# Patient Record
Sex: MALE
[De-identification: names, ages, dates, MRNs, and addresses within clinical notes are randomized per-mention and may not be internally consistent; named-entity substitution may affect disease eponyms.]

---

## 2018-03-01 ENCOUNTER — RESULT REVIEW (OUTPATIENT)
Age: 48
End: 2018-03-01

## 2018-03-01 ENCOUNTER — INPATIENT (INPATIENT)
Facility: HOSPITAL | Age: 48
LOS: 0 days | Discharge: HOME | End: 2018-03-02
Attending: SURGERY

## 2018-03-01 VITALS
OXYGEN SATURATION: 98 % | TEMPERATURE: 97 F | RESPIRATION RATE: 20 BRPM | HEART RATE: 101 BPM | DIASTOLIC BLOOD PRESSURE: 89 MMHG | SYSTOLIC BLOOD PRESSURE: 145 MMHG

## 2018-03-01 DIAGNOSIS — K35.3 ACUTE APPENDICITIS WITH LOCALIZED PERITONITIS: ICD-10-CM

## 2018-03-01 LAB
ANION GAP SERPL CALC-SCNC: 7 MMOL/L — SIGNIFICANT CHANGE UP (ref 7–14)
APPEARANCE UR: CLEAR — SIGNIFICANT CHANGE UP
APTT BLD: 27.2 SEC — SIGNIFICANT CHANGE UP (ref 27–39.2)
BASOPHILS # BLD AUTO: 0.08 K/UL — SIGNIFICANT CHANGE UP (ref 0–0.2)
BASOPHILS NFR BLD AUTO: 0.4 % — SIGNIFICANT CHANGE UP (ref 0–1)
BILIRUB UR-MCNC: NEGATIVE — SIGNIFICANT CHANGE UP
BLD GP AB SCN SERPL QL: SIGNIFICANT CHANGE UP
BUN SERPL-MCNC: 17 MG/DL — SIGNIFICANT CHANGE UP (ref 10–20)
CALCIUM SERPL-MCNC: 9.4 MG/DL — SIGNIFICANT CHANGE UP (ref 8.5–10.1)
CHLORIDE SERPL-SCNC: 105 MMOL/L — SIGNIFICANT CHANGE UP (ref 98–110)
CO2 SERPL-SCNC: 25 MMOL/L — SIGNIFICANT CHANGE UP (ref 17–32)
COLOR SPEC: YELLOW — SIGNIFICANT CHANGE UP
CREAT SERPL-MCNC: 0.8 MG/DL — SIGNIFICANT CHANGE UP (ref 0.7–1.5)
DIFF PNL FLD: NEGATIVE — SIGNIFICANT CHANGE UP
EOSINOPHIL # BLD AUTO: 0.26 K/UL — SIGNIFICANT CHANGE UP (ref 0–0.7)
EOSINOPHIL NFR BLD AUTO: 1.4 % — SIGNIFICANT CHANGE UP (ref 0–8)
GLUCOSE SERPL-MCNC: 209 MG/DL — HIGH (ref 70–110)
GLUCOSE UR QL: >=1000
HCT VFR BLD CALC: 44.9 % — SIGNIFICANT CHANGE UP (ref 42–52)
HGB BLD-MCNC: 15.4 G/DL — SIGNIFICANT CHANGE UP (ref 14–18)
IMM GRANULOCYTES NFR BLD AUTO: 0.6 % — HIGH (ref 0.1–0.3)
INR BLD: 1 RATIO — SIGNIFICANT CHANGE UP (ref 0.65–1.3)
KETONES UR-MCNC: NEGATIVE — SIGNIFICANT CHANGE UP
LEUKOCYTE ESTERASE UR-ACNC: NEGATIVE — SIGNIFICANT CHANGE UP
LYMPHOCYTES # BLD AUTO: 22.4 % — SIGNIFICANT CHANGE UP (ref 20.5–51.1)
LYMPHOCYTES # BLD AUTO: 4.15 K/UL — HIGH (ref 1.2–3.4)
MCHC RBC-ENTMCNC: 28.7 PG — SIGNIFICANT CHANGE UP (ref 27–31)
MCHC RBC-ENTMCNC: 34.3 G/DL — SIGNIFICANT CHANGE UP (ref 32–37)
MCV RBC AUTO: 83.8 FL — SIGNIFICANT CHANGE UP (ref 80–94)
MONOCYTES # BLD AUTO: 1.5 K/UL — HIGH (ref 0.1–0.6)
MONOCYTES NFR BLD AUTO: 8.1 % — SIGNIFICANT CHANGE UP (ref 1.7–9.3)
NEUTROPHILS # BLD AUTO: 12.45 K/UL — HIGH (ref 1.4–6.5)
NEUTROPHILS NFR BLD AUTO: 67.1 % — SIGNIFICANT CHANGE UP (ref 42.2–75.2)
NITRITE UR-MCNC: NEGATIVE — SIGNIFICANT CHANGE UP
NRBC # BLD: 0 /100 WBCS — SIGNIFICANT CHANGE UP (ref 0–0)
PH UR: 6 — SIGNIFICANT CHANGE UP (ref 5–8)
PLATELET # BLD AUTO: 371 K/UL — SIGNIFICANT CHANGE UP (ref 130–400)
POTASSIUM SERPL-MCNC: 4.1 MMOL/L — SIGNIFICANT CHANGE UP (ref 3.5–5)
POTASSIUM SERPL-SCNC: 4.1 MMOL/L — SIGNIFICANT CHANGE UP (ref 3.5–5)
PROT UR-MCNC: NEGATIVE — SIGNIFICANT CHANGE UP
PROTHROM AB SERPL-ACNC: 10.8 SEC — SIGNIFICANT CHANGE UP (ref 9.95–12.87)
RBC # BLD: 5.36 M/UL — SIGNIFICANT CHANGE UP (ref 4.7–6.1)
RBC # FLD: 13 % — SIGNIFICANT CHANGE UP (ref 11.5–14.5)
SODIUM SERPL-SCNC: 137 MMOL/L — SIGNIFICANT CHANGE UP (ref 135–146)
SP GR SPEC: >=1.03 — SIGNIFICANT CHANGE UP (ref 1.01–1.03)
TYPE + AB SCN PNL BLD: SIGNIFICANT CHANGE UP
UROBILINOGEN FLD QL: 0.2 — SIGNIFICANT CHANGE UP (ref 0.2–0.2)
WBC # BLD: 18.56 K/UL — HIGH (ref 4.8–10.8)
WBC # FLD AUTO: 18.56 K/UL — HIGH (ref 4.8–10.8)

## 2018-03-01 RX ORDER — MORPHINE SULFATE 50 MG/1
4 CAPSULE, EXTENDED RELEASE ORAL
Qty: 0 | Refills: 0 | Status: DISCONTINUED | OUTPATIENT
Start: 2018-03-01 | End: 2018-03-02

## 2018-03-01 RX ORDER — ACETAMINOPHEN 500 MG
650 TABLET ORAL EVERY 6 HOURS
Qty: 0 | Refills: 0 | Status: DISCONTINUED | OUTPATIENT
Start: 2018-03-01 | End: 2018-03-01

## 2018-03-01 RX ORDER — PANTOPRAZOLE SODIUM 20 MG/1
40 TABLET, DELAYED RELEASE ORAL DAILY
Qty: 0 | Refills: 0 | Status: DISCONTINUED | OUTPATIENT
Start: 2018-03-01 | End: 2018-03-01

## 2018-03-01 RX ORDER — SODIUM CHLORIDE 9 MG/ML
3 INJECTION INTRAMUSCULAR; INTRAVENOUS; SUBCUTANEOUS EVERY 8 HOURS
Qty: 0 | Refills: 0 | Status: DISCONTINUED | OUTPATIENT
Start: 2018-03-01 | End: 2018-03-01

## 2018-03-01 RX ORDER — MORPHINE SULFATE 50 MG/1
2 CAPSULE, EXTENDED RELEASE ORAL
Qty: 0 | Refills: 0 | Status: DISCONTINUED | OUTPATIENT
Start: 2018-03-01 | End: 2018-03-02

## 2018-03-01 RX ORDER — HEPARIN SODIUM 5000 [USP'U]/ML
5000 INJECTION INTRAVENOUS; SUBCUTANEOUS EVERY 8 HOURS
Qty: 0 | Refills: 0 | Status: DISCONTINUED | OUTPATIENT
Start: 2018-03-01 | End: 2018-03-01

## 2018-03-01 RX ORDER — FENOFIBRATE,MICRONIZED 130 MG
1 CAPSULE ORAL
Qty: 0 | Refills: 0 | COMMUNITY

## 2018-03-01 RX ORDER — MORPHINE SULFATE 50 MG/1
4 CAPSULE, EXTENDED RELEASE ORAL ONCE
Qty: 0 | Refills: 0 | Status: DISCONTINUED | OUTPATIENT
Start: 2018-03-01 | End: 2018-03-01

## 2018-03-01 RX ORDER — KETOROLAC TROMETHAMINE 30 MG/ML
30 SYRINGE (ML) INJECTION ONCE
Qty: 0 | Refills: 0 | Status: DISCONTINUED | OUTPATIENT
Start: 2018-03-01 | End: 2018-03-02

## 2018-03-01 RX ORDER — CEFOTETAN DISODIUM 1 G
1 VIAL (EA) INJECTION EVERY 12 HOURS
Qty: 0 | Refills: 0 | Status: DISCONTINUED | OUTPATIENT
Start: 2018-03-01 | End: 2018-03-01

## 2018-03-01 RX ORDER — SODIUM CHLORIDE 9 MG/ML
1000 INJECTION INTRAMUSCULAR; INTRAVENOUS; SUBCUTANEOUS
Qty: 0 | Refills: 0 | Status: DISCONTINUED | OUTPATIENT
Start: 2018-03-01 | End: 2018-03-01

## 2018-03-01 RX ORDER — SODIUM CHLORIDE 9 MG/ML
1000 INJECTION, SOLUTION INTRAVENOUS
Qty: 0 | Refills: 0 | Status: DISCONTINUED | OUTPATIENT
Start: 2018-03-01 | End: 2018-03-02

## 2018-03-01 RX ORDER — MORPHINE SULFATE 50 MG/1
4 CAPSULE, EXTENDED RELEASE ORAL
Qty: 0 | Refills: 0 | Status: DISCONTINUED | OUTPATIENT
Start: 2018-03-01 | End: 2018-03-01

## 2018-03-01 RX ORDER — AMPICILLIN SODIUM AND SULBACTAM SODIUM 250; 125 MG/ML; MG/ML
3 INJECTION, POWDER, FOR SUSPENSION INTRAMUSCULAR; INTRAVENOUS ONCE
Qty: 0 | Refills: 0 | Status: DISCONTINUED | OUTPATIENT
Start: 2018-03-01 | End: 2018-03-01

## 2018-03-01 RX ORDER — MORPHINE SULFATE 50 MG/1
4 CAPSULE, EXTENDED RELEASE ORAL EVERY 4 HOURS
Qty: 0 | Refills: 0 | Status: DISCONTINUED | OUTPATIENT
Start: 2018-03-01 | End: 2018-03-01

## 2018-03-01 RX ORDER — HEPARIN SODIUM 5000 [USP'U]/ML
5000 INJECTION INTRAVENOUS; SUBCUTANEOUS EVERY 8 HOURS
Qty: 0 | Refills: 0 | Status: DISCONTINUED | OUTPATIENT
Start: 2018-03-01 | End: 2018-03-02

## 2018-03-01 RX ORDER — SODIUM CHLORIDE 9 MG/ML
1000 INJECTION, SOLUTION INTRAVENOUS
Qty: 0 | Refills: 0 | Status: DISCONTINUED | OUTPATIENT
Start: 2018-03-01 | End: 2018-03-01

## 2018-03-01 RX ORDER — AMPICILLIN SODIUM AND SULBACTAM SODIUM 250; 125 MG/ML; MG/ML
INJECTION, POWDER, FOR SUSPENSION INTRAMUSCULAR; INTRAVENOUS
Qty: 0 | Refills: 0 | Status: DISCONTINUED | OUTPATIENT
Start: 2018-03-01 | End: 2018-03-01

## 2018-03-01 RX ORDER — SODIUM CHLORIDE 9 MG/ML
1000 INJECTION INTRAMUSCULAR; INTRAVENOUS; SUBCUTANEOUS
Qty: 0 | Refills: 0 | Status: DISCONTINUED | OUTPATIENT
Start: 2018-03-01 | End: 2018-03-02

## 2018-03-01 RX ORDER — AMPICILLIN SODIUM AND SULBACTAM SODIUM 250; 125 MG/ML; MG/ML
3 INJECTION, POWDER, FOR SUSPENSION INTRAMUSCULAR; INTRAVENOUS EVERY 6 HOURS
Qty: 0 | Refills: 0 | Status: DISCONTINUED | OUTPATIENT
Start: 2018-03-01 | End: 2018-03-01

## 2018-03-01 RX ADMIN — SODIUM CHLORIDE 200 MILLILITER(S): 9 INJECTION INTRAMUSCULAR; INTRAVENOUS; SUBCUTANEOUS at 05:05

## 2018-03-01 RX ADMIN — SODIUM CHLORIDE 125 MILLILITER(S): 9 INJECTION INTRAMUSCULAR; INTRAVENOUS; SUBCUTANEOUS at 10:13

## 2018-03-01 RX ADMIN — SODIUM CHLORIDE 50 MILLILITER(S): 9 INJECTION, SOLUTION INTRAVENOUS at 17:00

## 2018-03-01 RX ADMIN — Medication 100 GRAM(S): at 05:44

## 2018-03-01 RX ADMIN — MORPHINE SULFATE 4 MILLIGRAM(S): 50 CAPSULE, EXTENDED RELEASE ORAL at 14:00

## 2018-03-01 RX ADMIN — MORPHINE SULFATE 4 MILLIGRAM(S): 50 CAPSULE, EXTENDED RELEASE ORAL at 05:15

## 2018-03-01 RX ADMIN — SODIUM CHLORIDE 3 MILLILITER(S): 9 INJECTION INTRAMUSCULAR; INTRAVENOUS; SUBCUTANEOUS at 05:05

## 2018-03-01 RX ADMIN — HEPARIN SODIUM 5000 UNIT(S): 5000 INJECTION INTRAVENOUS; SUBCUTANEOUS at 21:27

## 2018-03-01 NOTE — CHART NOTE - NSCHARTNOTEFT_GEN_A_CORE
PACU ANESTHESIA ADMISSION NOTE      Procedure: Lap appendectomy   Post op diagnosis:  Acute appendicitis       Patent Airway     Full return of protective reflexes     Full recovery from anesthesia / back to baseline     Vitals:   T:     98.9f      R: 14                 BP:   139/65               Sat:  95%                 P: 98      Mental Status:  __x__ Awake   ___x__ Alert       Nausea/Vomiting: NO     Pain Scale (0-10):  0       Post - Operative Fluids:   See Post - Op Orders    Plan: Discharge:   _Floor

## 2018-03-01 NOTE — PRE-ANESTHESIA EVALUATION ADULT - NSANTHPMHFT_GEN_ALL_CORE
47 y.o male with PMHx of obesity , heavy smoker , HTN , DMTII. Presents with 2 weeks of abdominal pain , on CT acute appendicitis

## 2018-03-01 NOTE — PROGRESS NOTE ADULT - SUBJECTIVE AND OBJECTIVE BOX
POST-OP CHECK    PROCEDURE: S/P laparoscopic appendectomy    S: Pt awake and alert resting comfortaby in bed. Pain controlled. Pt denies N/V, SOB, CP, palpitations.     O:   T(C): 37.1 (03-01-18 @ 17:30), Max: 37.1 (03-01-18 @ 17:30)  HR: 100 (03-01-18 @ 17:30) (93 - 106)  BP: 126/74 (03-01-18 @ 17:30) (113/70 - 135/82)  RR: 18 (03-01-18 @ 17:30) (16 - 26)  SpO2: 95% (03-01-18 @ 17:30) (93% - 95%)  I&O's Summary    01 Mar 2018 07:01  -  01 Mar 2018 18:02  --------------------------------------------------------  IN: 150 mL / OUT: 0 mL / NET: 150 mL      Diet, Regular      PHYSICAL EXAM:    GEN: NAD  ABD: soft, nontender, nondistended, no rebound or guarding, dressing is clean, dry, intact  INC: C/D/I    MEDICATIONS  (STANDING):  heparin  Injectable 5000 Unit(s) SubCutaneous every 8 hours  lactated ringers. 1000 milliLiter(s) (50 mL/Hr) IV Continuous <Continuous>  sodium chloride 0.9%. 1000 milliLiter(s) (20 mL/Hr) IV Continuous <Continuous>    MEDICATIONS  (PRN):  ketorolac   Injectable 30 milliGRAM(s) IV Push once PRN Moderate Pain  morphine  - Injectable 2 milliGRAM(s) IV Push every 10 minutes PRN Moderate Pain (4 - 6)  morphine  - Injectable 4 milliGRAM(s) IV Push every 10 minutes PRN Severe Pain (7 - 10)      LABS:                        15.4   18.56 )-----------( 371      ( 01 Mar 2018 05:05 )             44.9     03-01    137  |  105  |  17  ----------------------------<  209<H>  4.1   |  25  |  0.8    Ca    9.4      01 Mar 2018 05:05        PT/INR - ( 01 Mar 2018 09:30 )   PT: 10.80 sec;   INR: 1.00 ratio         PTT - ( 01 Mar 2018 09:30 )  PTT:27.2 sec

## 2018-03-01 NOTE — H&P ADULT - NSHPPHYSICALEXAM_GEN_ALL_CORE
· Physical Examination: VITAL SIGNS: I have reviewed nursing notes and confirm.  CONSTITUTIONAL: Well-developed; well-nourished; in no acute distress.  SKIN: Skin exam is warm and dry, no acute rash.  HEAD: Normocephalic; atraumatic.  EYES: PERRL, EOM intact; conjunctiva and sclera clear.  ENT: No nasal discharge; airway clear. TMs clear.  NECK: Supple; non tender.  CARD: S1, S2 normal; no murmurs, gallops, or rubs. Regular rate and rhythm.  RESP: No wheezes, rales or rhonchi.  ABD: Abdomen soft, right lower quadrant guarding, no rebound, no generalized peritonism. Rovsing's positive.   EXT: Normal ROM. No clubbing, cyanosis or edema.  LYMPH: No acute cervical adenopathy.  NEURO: Alert, oriented. Grossly unremarkable. No focal deficits.

## 2018-03-01 NOTE — H&P ADULT - NSHPREVIEWOFSYSTEMS_GEN_ALL_CORE
· CONSTITUTIONAL: no fever and no chills.  · EYES: no discharge, no irritation, no pain, no redness, and no visual changes.  · ENMT: Ears: no ear pain and no hearing problems.Nose: no nasal congestion and no nasal drainage.Mouth/Throat: no dysphagia, no hoarseness and no throat pain.Neck: no lumps, no pain, no stiffness and no swollen glands.  · CARDIOVASCULAR: normal rate and rhythm, no chest pain and no edema.  · RESPIRATORY: no chest pain, no cough, and no shortness of breath.  · MUSCULOSKELETAL: no back pain, no gout, no musculoskeletal pain, no neck pain, and no weakness.  · SKIN: no abrasions, no jaundice, no lesions, no pruritis, and no rashes.  · NEURO: no loss of consciousness, no gait abnormality, no headache, no sensory deficits, and no weakness.

## 2018-03-01 NOTE — H&P ADULT - ASSESSMENT
47 year old male presents with 2 weeks of abdominal pain with findings consistent with acute appendicitis.     Discussed patient with Dr Mclean.     Plan:  NPO, IVF  IV abx   Analgesia  OR for lap appendectomy

## 2018-03-01 NOTE — H&P ADULT - NSHPLABSRESULTS_GEN_ALL_CORE
Labs:  CAPILLARY BLOOD GLUCOSE                              15.4   18.56 )-----------( 371      ( 01 Mar 2018 05:05 )             44.9       Auto Neutrophil %: 67.1 % (03-01-18 @ 05:05)  Auto Immature Granulocyte %: 0.6 % (03-01-18 @ 05:05)    03-01    137  |  105  |  17  ----------------------------<  209<H>  4.1   |  25  |  0.8      Calcium, Total Serum: 9.4 mg/dL (03-01-18 @ 05:05)      LFTs:         Coags:        Urinalysis Basic - ( 01 Mar 2018 05:05 )    Color: Yellow / Appearance: Clear / SG: >=1.030 / pH: x  Gluc: x / Ketone: Negative  / Bili: Negative / Urobili: 0.2   Blood: x / Protein: Negative / Nitrite: Negative   Leuk Esterase: Negative / RBC: x / WBC x   Sq Epi: x / Non Sq Epi: x / Bacteria: x        < from: CT Abdomen and Pelvis w/ IV Cont (03.01.18 @ 06:05) >    BOWEL: There is no bowel obstruction. The appendix isdilated to 1 cm   with periappendiceal inflammation noted.    < end of copied text >

## 2018-03-01 NOTE — ED PROVIDER NOTE - OBJECTIVE STATEMENT
pt c/o lwoer ab pain for several days, saaw pmd and given abx, scheduled for outpt CT but couldn't wait. no fever n, v, d. pain non radiating.  no back pain. no cp or sob. no bloody stools.

## 2018-03-01 NOTE — ED PROVIDER NOTE - PHYSICAL EXAMINATION
VITAL SIGNS: I have reviewed nursing notes and confirm.  CONSTITUTIONAL: Well-developed; well-nourished; in no acute distress.  SKIN: Skin exam is warm and dry, no acute rash.  HEAD: Normocephalic; atraumatic.  EYES: PERRL, EOM intact; conjunctiva and sclera clear.  ENT: No nasal discharge; airway clear. TMs clear.  NECK: Supple; non tender.  CARD: S1, S2 normal; no murmurs, gallops, or rubs. Regular rate and rhythm.  RESP: No wheezes, rales or rhonchi.  ABD: Normal bowel sounds; soft; non-distended; tender rlq/suprapubic, no rebound. no hepatosplenomegaly.  EXT: Normal ROM. No clubbing, cyanosis or edema.  LYMPH: No acute cervical adenopathy.  NEURO: Alert, oriented. Grossly unremarkable. No focal deficits.  PSYCH: Cooperative, appropriate.

## 2018-03-01 NOTE — BRIEF OPERATIVE NOTE - PROCEDURE
<<-----Click on this checkbox to enter Procedure Appendectomy, laparoscopic  03/01/2018    Active  [id number]

## 2018-03-01 NOTE — PRE-ANESTHESIA EVALUATION ADULT - NSANTHOSAYNRD_GEN_A_CORE
No. ROM screening performed.  STOP BANG Legend: 0-2 = LOW Risk; 3-4 = INTERMEDIATE Risk; 5-8 = HIGH Risk

## 2018-03-01 NOTE — PROGRESS NOTE ADULT - ASSESSMENT
ASSESSMENT/PLAN:   47y y/o  Male  s/p laparoscopic appendectomy    PLAN:   - tolerating diet  - ambulate  -trial of void   -pain control  - GI/DVT ppx  - D/C home in the AM    SPECTRA: [id number]

## 2018-03-01 NOTE — H&P ADULT - HISTORY OF PRESENT ILLNESS
47 year old male who presents with 2 weeks of lower abdominal pain. Patient recalls no trigger and there was sudeen onset of central lower abdominal pain with radiation of pain down bilateral testicles. He noted increasing urinary frequency and urinary retention without burning sensation on urination. Urine looked of light color - yellow/green. Otherwise there was no upper abdominal discomfort or symptoms. He continues to tolerate PO intake with good appetite, denying nausea or vomiting. He also denies having fever at home. Due to the urinary symptoms he was given PO antibiotics by his PMD which did not resolve the symptoms. He denies previous pain similar to this and has had no previous abdominal surgeries.

## 2018-03-01 NOTE — ED PROVIDER NOTE - MEDICAL DECISION MAKING DETAILS
see note Chart finished.     D/w Surgery - requesting coags and EKG/XR chest that they will follow up.  Pt for OR later today.

## 2018-03-02 ENCOUNTER — TRANSCRIPTION ENCOUNTER (OUTPATIENT)
Age: 48
End: 2018-03-02

## 2018-03-02 VITALS
HEART RATE: 97 BPM | SYSTOLIC BLOOD PRESSURE: 111 MMHG | TEMPERATURE: 98 F | DIASTOLIC BLOOD PRESSURE: 70 MMHG | RESPIRATION RATE: 18 BRPM

## 2018-03-02 RX ORDER — IBUPROFEN 200 MG
600 TABLET ORAL EVERY 6 HOURS
Qty: 0 | Refills: 0 | Status: DISCONTINUED | OUTPATIENT
Start: 2018-03-02 | End: 2018-03-02

## 2018-03-02 RX ORDER — OXYCODONE HYDROCHLORIDE 5 MG/1
1 TABLET ORAL
Qty: 5 | Refills: 0 | OUTPATIENT
Start: 2018-03-02

## 2018-03-02 RX ORDER — OXYCODONE HYDROCHLORIDE 5 MG/1
5 TABLET ORAL EVERY 4 HOURS
Qty: 0 | Refills: 0 | Status: DISCONTINUED | OUTPATIENT
Start: 2018-03-02 | End: 2018-03-02

## 2018-03-02 RX ORDER — IBUPROFEN 200 MG
1 TABLET ORAL
Qty: 20 | Refills: 0 | OUTPATIENT
Start: 2018-03-02 | End: 2018-03-06

## 2018-03-02 RX ORDER — ACETAMINOPHEN 500 MG
2 TABLET ORAL
Qty: 0 | Refills: 0 | COMMUNITY
Start: 2018-03-02

## 2018-03-02 RX ORDER — IBUPROFEN 200 MG
1 TABLET ORAL
Qty: 0 | Refills: 0 | COMMUNITY
Start: 2018-03-02

## 2018-03-02 RX ORDER — ACETAMINOPHEN 500 MG
650 TABLET ORAL EVERY 6 HOURS
Qty: 0 | Refills: 0 | Status: DISCONTINUED | OUTPATIENT
Start: 2018-03-02 | End: 2018-03-02

## 2018-03-02 RX ADMIN — HEPARIN SODIUM 5000 UNIT(S): 5000 INJECTION INTRAVENOUS; SUBCUTANEOUS at 05:16

## 2018-03-02 RX ADMIN — Medication 650 MILLIGRAM(S): at 05:16

## 2018-03-02 RX ADMIN — Medication 600 MILLIGRAM(S): at 05:16

## 2018-03-02 RX ADMIN — Medication 650 MILLIGRAM(S): at 11:11

## 2018-03-02 RX ADMIN — Medication 600 MILLIGRAM(S): at 11:11

## 2018-03-02 NOTE — PROGRESS NOTE ADULT - SUBJECTIVE AND OBJECTIVE BOX
GENERAL SURGERY PROGRESS NOTE    Patient: KATHI KEYS , 47y ([birth year])Male   MRN: [id number]  Location: [address]  Visit: 03-01-18 Inpatient  Date: 03-02-18 @ 04:45    Hospital Day #:2  Post-Op Day #:1    Procedure/Dx/Injuries: S/P LAPAROSCOPIC APPENDECTOMY    Events of past 24 hours: no acute events o/n    PAST MEDICAL & SURGICAL HISTORY:  Diabetes mellitus, type 2  Hypertension  High cholesterol    Vitals: T(F): 96.4 (03-02-18 @ 00:06), Max: 98.9 (03-01-18 @ 13:53)  HR: 104 (03-02-18 @ 00:06)  BP: 127/78 (03-02-18 @ 00:06)  RR: 18 (03-02-18 @ 00:06)  SpO2: 95% (03-01-18 @ 18:18)    Pain (0-10):3/10         Pain Control Adequate: [x] YES [] N    Diet, Regular    Fluids: IVL    I & O's:    Bowel Movement: : [] YES [x] NO  Flatus: : [] YES [x] NO    PHYSICAL EXAM  GEN: NAD  ABD: SOFT, NT/ND, NO REBOUND, NO GUARDING. INCISIONAL DRESSING C/D/I  INCISION: C/D/I    MEDICATIONS  (STANDING):  heparin  Injectable 5000 Unit(s) SubCutaneous every 8 hours  lactated ringers. 1000 milliLiter(s) (50 mL/Hr) IV Continuous <Continuous>    MEDICATIONS  (PRN):  morphine  - Injectable 2 milliGRAM(s) IV Push every 10 minutes PRN Moderate Pain (4 - 6)  morphine  - Injectable 4 milliGRAM(s) IV Push every 10 minutes PRN Severe Pain (7 - 10)    DVT PROPHYLAXIS: [X] YES [] NO   GI PROPHYLAXIS: [] YES [X] NO   ANTICOAGULATION: [] YES [X] NO   ANTIBIOTICS: [] YES [X] NO     LAB/STUDIES:               15.4   18.56 )-----------( 371      ( 01 Mar 2018 05:05 )             44.9     03-01    137  |  105  |  17  ----------------------------<  209<H>  4.1   |  25  |  0.8    Ca    9.4      01 Mar 2018 05:05        PT/INR - ( 01 Mar 2018 09:30 )   PT: 10.80 sec;   INR: 1.00 ratio         PTT - ( 01 Mar 2018 09:30 )  PTT:27.2 sec  Urinalysis Basic - ( 01 Mar 2018 05:05 )    Color: Yellow / Appearance: Clear / SG: >=1.030 / pH: x  Gluc: x / Ketone: Negative  / Bili: Negative / Urobili: 0.2   Blood: x / Protein: Negative / Nitrite: Negative   Leuk Esterase: Negative / RBC: x / WBC x   Sq Epi: x / Non Sq Epi: x / Bacteria: x

## 2018-03-02 NOTE — DISCHARGE NOTE ADULT - FINDINGS/TREATMENT
uncomplicated appendectomy. no abx needed. Will be discharged to home with pain regimen for pain control, follow up with Dr. Mclean in 2 weeks.

## 2018-03-02 NOTE — DISCHARGE NOTE ADULT - HOSPITAL COURSE
47 year old male who presents with 2 weeks of lower abdominal pain. Patient recalls no trigger and there was sudden onset of central lower abdominal pain with radiation of pain down bilateral testicles. He noted increasing urinary frequency and urinary retention without burning sensation on urination. Urine looked of light color - yellow/green. Otherwise there was no upper abdominal discomfort or symptoms. He continues to tolerate PO intake with good appetite, denying nausea or vomiting. He also denies having fever at home. Due to the urinary symptoms he was given PO antibiotics by his PMD which did not resolve the symptoms. He denies previous pain similar to this and has had no previous abdominal surgeries. CT scan showed acute appendicitis. The patient was taken to the OR for laparoscopic appendectomy and admitted for observation overnight. The patient tolerated regular diet, ambulated and will be discharged to home with instructions to follow up as an outpatient with Dr. Mclean in 2 weeks.

## 2018-03-02 NOTE — PROGRESS NOTE ADULT - ASSESSMENT
ASSESSMENT:  48M PRESENTED W/ LOWER ABD PAIN, ON WORK UP FOUND TO HAVE ACUTE APPENDICTIS, NOW S/P LAPAROSCOPIC APPENDECTOMY. DOING WELL, TOLERATING DIET, AMBULATING, VOIDING, PAIN CONTROLLED.     PLAN:  - TOLERATING DIET, AMBULATING, VOIDING,   - D/C HOME IN AM

## 2018-03-02 NOTE — DISCHARGE NOTE ADULT - CARE PLAN
Principal Discharge DX:	Acute appendicitis with localized peritonitis  Goal:	Complete Recovery  Assessment and plan of treatment:	Take tylenol and motrin every 4-6 hours around the clock for at least 5 days for pain. Take Roxicodone as needed for breakthrough pain.  Follow up with Dr. Mclean as an outpatient in 2 weeks, call to schedule an appointment.  Avoid heavy lifting for two weeks. (nothing over 10lbs)  Secondary Diagnosis:	High cholesterol

## 2018-03-02 NOTE — DISCHARGE NOTE ADULT - CARE PROVIDER_API CALL
Zuhair Mclean), Surgery  [address] [place] [address]  Phone: [phone]  Fax: [phone]

## 2018-03-02 NOTE — DISCHARGE NOTE ADULT - PLAN OF CARE
Complete Recovery Take tylenol and motrin every 4-6 hours around the clock for at least 5 days for pain. Take Roxicodone as needed for breakthrough pain.  Follow up with Dr. Mclean as an outpatient in 2 weeks, call to schedule an appointment.  Avoid heavy lifting for two weeks. (nothing over 10lbs)

## 2018-03-02 NOTE — DISCHARGE NOTE ADULT - PATIENT PORTAL LINK FT
You can access the [facility] Patient Portal, offered by [facility], by registering with the following website: [email or web address]

## 2018-03-05 PROBLEM — I10 ESSENTIAL (PRIMARY) HYPERTENSION: Chronic | Status: ACTIVE | Noted: 2018-03-01

## 2018-03-05 PROBLEM — E11.9 TYPE 2 DIABETES MELLITUS WITHOUT COMPLICATIONS: Chronic | Status: ACTIVE | Noted: 2018-03-01

## 2018-03-05 PROBLEM — E78.00 PURE HYPERCHOLESTEROLEMIA, UNSPECIFIED: Chronic | Status: ACTIVE | Noted: 2018-03-01

## 2018-03-06 DIAGNOSIS — I10 ESSENTIAL (PRIMARY) HYPERTENSION: ICD-10-CM

## 2018-03-06 DIAGNOSIS — E11.9 TYPE 2 DIABETES MELLITUS WITHOUT COMPLICATIONS: ICD-10-CM

## 2018-03-06 DIAGNOSIS — K35.3 ACUTE APPENDICITIS WITH LOCALIZED PERITONITIS: ICD-10-CM

## 2018-03-06 DIAGNOSIS — E78.00 PURE HYPERCHOLESTEROLEMIA, UNSPECIFIED: ICD-10-CM

## 2018-03-06 DIAGNOSIS — R10.9 UNSPECIFIED ABDOMINAL PAIN: ICD-10-CM

## 2018-03-06 PROBLEM — Z00.00 ENCOUNTER FOR PREVENTIVE HEALTH EXAMINATION: Status: ACTIVE | Noted: 2018-03-06

## 2018-03-06 LAB — SURGICAL PATHOLOGY STUDY: SIGNIFICANT CHANGE UP

## 2018-03-08 ENCOUNTER — APPOINTMENT (OUTPATIENT)
Dept: SURGERY | Facility: CLINIC | Age: 48
End: 2018-03-08
Payer: COMMERCIAL

## 2018-03-08 VITALS
BODY MASS INDEX: 32.82 KG/M2 | SYSTOLIC BLOOD PRESSURE: 132 MMHG | WEIGHT: 278 LBS | DIASTOLIC BLOOD PRESSURE: 80 MMHG | HEIGHT: 77 IN

## 2018-03-08 DIAGNOSIS — K35.3 ACUTE APPENDICITIS WITH LOCALIZED PERITONITIS: ICD-10-CM

## 2018-03-08 PROCEDURE — 99024 POSTOP FOLLOW-UP VISIT: CPT

## 2018-03-16 ENCOUNTER — TRANSCRIPTION ENCOUNTER (OUTPATIENT)
Age: 48
End: 2018-03-16

## 2018-03-16 ENCOUNTER — OUTPATIENT (OUTPATIENT)
Dept: OUTPATIENT SERVICES | Facility: HOSPITAL | Age: 48
LOS: 1 days | Discharge: HOME | End: 2018-03-16

## 2018-03-16 DIAGNOSIS — E29.1 TESTICULAR HYPOFUNCTION: ICD-10-CM

## 2018-03-16 DIAGNOSIS — R35.0 FREQUENCY OF MICTURITION: ICD-10-CM

## 2018-04-12 ENCOUNTER — APPOINTMENT (OUTPATIENT)
Dept: SURGERY | Facility: CLINIC | Age: 48
End: 2018-04-12

## 2020-08-26 ENCOUNTER — RESULT CHARGE (OUTPATIENT)
Age: 50
End: 2020-08-26

## 2020-08-26 ENCOUNTER — APPOINTMENT (OUTPATIENT)
Dept: CARDIOLOGY | Facility: CLINIC | Age: 50
End: 2020-08-26
Payer: COMMERCIAL

## 2020-08-26 VITALS
BODY MASS INDEX: 31.31 KG/M2 | HEART RATE: 81 BPM | DIASTOLIC BLOOD PRESSURE: 84 MMHG | SYSTOLIC BLOOD PRESSURE: 114 MMHG | TEMPERATURE: 98 F | WEIGHT: 264 LBS

## 2020-08-26 DIAGNOSIS — Q24.5 MALFORMATION OF CORONARY VESSELS: ICD-10-CM

## 2020-08-26 DIAGNOSIS — I10 ESSENTIAL (PRIMARY) HYPERTENSION: ICD-10-CM

## 2020-08-26 DIAGNOSIS — Z72.0 TOBACCO USE: ICD-10-CM

## 2020-08-26 DIAGNOSIS — E11.9 TYPE 2 DIABETES MELLITUS W/OUT COMPLICATIONS: ICD-10-CM

## 2020-08-26 DIAGNOSIS — Z82.49 FAMILY HISTORY OF ISCHEMIC HEART DISEASE AND OTHER DISEASES OF THE CIRCULATORY SYSTEM: ICD-10-CM

## 2020-08-26 DIAGNOSIS — F17.200 NICOTINE DEPENDENCE, UNSPECIFIED, UNCOMPLICATED: ICD-10-CM

## 2020-08-26 PROCEDURE — 99204 OFFICE O/P NEW MOD 45 MIN: CPT

## 2020-08-26 PROCEDURE — 93000 ELECTROCARDIOGRAM COMPLETE: CPT

## 2020-08-26 RX ORDER — VARENICLINE TARTRATE 0.5 (11)-1
0.5 MG X 11 & KIT ORAL
Qty: 1 | Refills: 0 | Status: ACTIVE | COMMUNITY
Start: 2020-08-26 | End: 1900-01-01

## 2020-08-26 RX ORDER — DULAGLUTIDE 0.75 MG/.5ML
0.75 INJECTION, SOLUTION SUBCUTANEOUS
Refills: 0 | Status: ACTIVE | COMMUNITY

## 2020-08-26 RX ORDER — EMPAGLIFLOZIN 25 MG/1
25 TABLET, FILM COATED ORAL
Refills: 0 | Status: ACTIVE | COMMUNITY

## 2020-08-26 RX ORDER — LOSARTAN POTASSIUM AND HYDROCHLOROTHIAZIDE 12.5; 1 MG/1; MG/1
100-12.5 TABLET ORAL
Refills: 0 | Status: ACTIVE | COMMUNITY

## 2020-08-26 NOTE — ASSESSMENT
[FreeTextEntry1] : Prior w/u reviewed\par Normal coronaries as of last CCTA\par Primary prevention discussed.\par Risk factors of DM, HTN and tobacco abuse discussed.\par Lipid management discussed\par Patient advised to have labs for cholesterol, HgA1c with the PMD.\par Smoking cessation strongly recommended.\par Will give a trail of Chantix. Potential side effects discussed. He was advised to call me with any changes or concerns.\par He will have a f/u scheduled with the PMD.\par F/u with me once a year if stable.

## 2020-08-26 NOTE — PHYSICAL EXAM
[General Appearance - Well Developed] : well developed [Normal Appearance] : normal appearance [Well Groomed] : well groomed [General Appearance - Well Nourished] : well nourished [No Deformities] : no deformities [General Appearance - In No Acute Distress] : no acute distress [Normal Conjunctiva] : the conjunctiva exhibited no abnormalities [Eyelids - No Xanthelasma] : the eyelids demonstrated no xanthelasmas [FreeTextEntry1] : no JVD, no Bruits [Heart Rate And Rhythm] : heart rate and rhythm were normal [Heart Sounds] : normal S1 and S2 [Murmurs] : no murmurs present [Edema] : no peripheral edema present [] : no respiratory distress [Respiration, Rhythm And Depth] : normal respiratory rhythm and effort [Exaggerated Use Of Accessory Muscles For Inspiration] : no accessory muscle use [Auscultation Breath Sounds / Voice Sounds] : lungs were clear to auscultation bilaterally [Bowel Sounds] : normal bowel sounds [Abdomen Soft] : soft [Abnormal Walk] : normal gait [Abdomen Tenderness] : non-tender [Nail Clubbing] : no clubbing of the fingernails [Petechial Hemorrhages (___cm)] : no petechial hemorrhages [Cyanosis, Localized] : no localized cyanosis [Skin Color & Pigmentation] : normal skin color and pigmentation [Skin Turgor] : normal skin turgor [Affect] : the affect was normal [Oriented To Time, Place, And Person] : oriented to person, place, and time

## 2020-08-26 NOTE — HISTORY OF PRESENT ILLNESS
[FreeTextEntry1] : 48 y/o male with history of HTN, DM2, active smoker, presents for evaluation. patient was last seen over 3 years ago. He had a CCTA in 2013, which revealed Calcium score of 0, and no plaque. 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 No chest pain. Does have some dyspnea on exertion, mild, location - chest, no associated symptoms. No edema. BP is controlled. DM is controlled.

## 2022-09-06 RX ORDER — TIRZEPATIDE 5 MG/.5ML
5 INJECTION, SOLUTION SUBCUTANEOUS
Qty: 1 | Refills: 0 | Status: ACTIVE | COMMUNITY
Start: 2022-09-06 | End: 1900-01-01

## 2022-11-29 RX ORDER — TIRZEPATIDE 10 MG/.5ML
10 INJECTION, SOLUTION SUBCUTANEOUS
Qty: 1 | Refills: 3 | Status: ACTIVE | COMMUNITY
Start: 2022-11-29 | End: 1900-01-01

## 2023-08-25 RX ORDER — PIOGLITAZONE HYDROCHLORIDE 30 MG/1
30 TABLET ORAL
Qty: 90 | Refills: 0 | Status: ACTIVE | COMMUNITY
Start: 2022-09-06 | End: 1900-01-01

## 2024-09-03 ENCOUNTER — NON-APPOINTMENT (OUTPATIENT)
Age: 54
End: 2024-09-03

## 2024-09-13 ENCOUNTER — NON-APPOINTMENT (OUTPATIENT)
Age: 54
End: 2024-09-13

## 2024-09-13 ENCOUNTER — APPOINTMENT (OUTPATIENT)
Dept: CARDIOLOGY | Facility: CLINIC | Age: 54
End: 2024-09-13
Payer: COMMERCIAL

## 2024-09-13 VITALS
BODY MASS INDEX: 32.82 KG/M2 | HEART RATE: 89 BPM | RESPIRATION RATE: 14 BRPM | HEIGHT: 77 IN | WEIGHT: 278 LBS | SYSTOLIC BLOOD PRESSURE: 115 MMHG | OXYGEN SATURATION: 99 % | DIASTOLIC BLOOD PRESSURE: 80 MMHG | TEMPERATURE: 96.6 F

## 2024-09-13 DIAGNOSIS — E11.9 TYPE 2 DIABETES MELLITUS W/OUT COMPLICATIONS: ICD-10-CM

## 2024-09-13 DIAGNOSIS — Z72.0 TOBACCO USE: ICD-10-CM

## 2024-09-13 DIAGNOSIS — Q24.5 MALFORMATION OF CORONARY VESSELS: ICD-10-CM

## 2024-09-13 DIAGNOSIS — I10 ESSENTIAL (PRIMARY) HYPERTENSION: ICD-10-CM

## 2024-09-13 PROCEDURE — 93000 ELECTROCARDIOGRAM COMPLETE: CPT

## 2024-09-13 PROCEDURE — 99204 OFFICE O/P NEW MOD 45 MIN: CPT | Mod: 25

## 2024-09-13 PROCEDURE — G2211 COMPLEX E/M VISIT ADD ON: CPT | Mod: NC

## 2024-09-13 RX ORDER — ROSUVASTATIN CALCIUM 20 MG/1
20 TABLET, FILM COATED ORAL DAILY
Refills: 0 | Status: ACTIVE | COMMUNITY

## 2024-09-13 RX ORDER — TADALAFIL 5 MG/1
5 TABLET ORAL
Refills: 0 | Status: ACTIVE | COMMUNITY

## 2024-09-13 NOTE — PHYSICAL EXAM
[General Appearance - Well Developed] : well developed [Normal Appearance] : normal appearance [Well Groomed] : well groomed [General Appearance - Well Nourished] : well nourished [No Deformities] : no deformities [General Appearance - In No Acute Distress] : no acute distress [Normal Conjunctiva] : the conjunctiva exhibited no abnormalities [Eyelids - No Xanthelasma] : the eyelids demonstrated no xanthelasmas [FreeTextEntry1] : no JVD, no Bruits [Heart Rate And Rhythm] : heart rate and rhythm were normal [Heart Sounds] : normal S1 and S2 [Murmurs] : no murmurs present [Edema] : no peripheral edema present [] : no respiratory distress [Respiration, Rhythm And Depth] : normal respiratory rhythm and effort [Exaggerated Use Of Accessory Muscles For Inspiration] : no accessory muscle use [Auscultation Breath Sounds / Voice Sounds] : lungs were clear to auscultation bilaterally [Bowel Sounds] : normal bowel sounds [Abdomen Soft] : soft [Abdomen Tenderness] : non-tender [Abnormal Walk] : normal gait [Nail Clubbing] : no clubbing of the fingernails [Cyanosis, Localized] : no localized cyanosis [Petechial Hemorrhages (___cm)] : no petechial hemorrhages [Skin Color & Pigmentation] : normal skin color and pigmentation [Skin Turgor] : normal skin turgor [Oriented To Time, Place, And Person] : oriented to person, place, and time [Affect] : the affect was normal

## 2024-09-13 NOTE — HISTORY OF PRESENT ILLNESS
[FreeTextEntry1] : 53 y/o male with history of HTN, DM2, DL, active smoker, presents for evaluation. The patient was last seen over 3 years ago. He had a CCTA in 2013, which revealed Calcium score of 0, and no plaque. 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 No chest pain. Does have some dyspnea on exertion, mild, location - chest, no associated symptoms. No edema. BP is controlled. DM is controlled. He is on appropriate medical therapy, was unable to quit smoking. Needs cardiac clearance for left shoulder surgery with Dr. Salazar. ET over 4 METs. No new symptoms.

## 2024-09-13 NOTE — ASSESSMENT
[FreeTextEntry1] : Prior w/u reviewed Normal coronaries as of last CCTA Primary prevention discussed. Risk factors of DM, HTN and tobacco abuse discussed. Lipid management discussed Patient advised to have labs for cholesterol, HgA1c with the PMD. Smoking cessation strongly recommended. In terms of his surgery, I feel he has no cardiac contraindications for the planned surgery and may proceed as planned. Forms filled out and sent. He will have a f/u scheduled with the PMD. F/u with me once a year if stable.

## 2024-09-17 ENCOUNTER — APPOINTMENT (OUTPATIENT)
Dept: PULMONOLOGY | Facility: CLINIC | Age: 54
End: 2024-09-17
Payer: COMMERCIAL

## 2024-09-17 VITALS
DIASTOLIC BLOOD PRESSURE: 80 MMHG | HEART RATE: 92 BPM | OXYGEN SATURATION: 97 % | BODY MASS INDEX: 33.06 KG/M2 | RESPIRATION RATE: 14 BRPM | HEIGHT: 77 IN | WEIGHT: 280 LBS | SYSTOLIC BLOOD PRESSURE: 136 MMHG

## 2024-09-17 DIAGNOSIS — J44.9 CHRONIC OBSTRUCTIVE PULMONARY DISEASE, UNSPECIFIED: ICD-10-CM

## 2024-09-17 PROCEDURE — 99204 OFFICE O/P NEW MOD 45 MIN: CPT | Mod: 25

## 2024-09-17 PROCEDURE — 71046 X-RAY EXAM CHEST 2 VIEWS: CPT

## 2024-09-17 NOTE — PHYSICAL EXAM
[No Acute Distress] : no acute distress [Normal Oropharynx] : normal oropharynx [IV] : Mallampati Class: IV [Normal Appearance] : normal appearance [No Neck Mass] : no neck mass [Normal Rate/Rhythm] : normal rate/rhythm [Normal S1, S2] : normal s1, s2 [No Murmurs] : no murmurs [No Resp Distress] : no resp distress [Clear to Auscultation Bilaterally] : clear to auscultation bilaterally [No Abnormalities] : no abnormalities [Benign] : benign [Normal Gait] : normal gait [No Clubbing] : no clubbing [No Cyanosis] : no cyanosis [No Edema] : no edema [FROM] : FROM [Normal Color/ Pigmentation] : normal color/ pigmentation [No Focal Deficits] : no focal deficits [Oriented x3] : oriented x3 [Normal Affect] : normal affect [TextBox_68] : Mild decreased breath sounds bilaterally no wheezing

## 2024-09-17 NOTE — REASON FOR VISIT
[Initial] : an initial visit [COPD] : COPD [Pre-op Risk Stratification] : pre-op risk stratification

## 2024-09-17 NOTE — ASSESSMENT
[FreeTextEntry1] : Assessment and Plan: - [Chief Complaint : Shoulder Pain - ICD 10 code M25.519 Pain in unspecified shoulder] The patient has been experiencing chronic shoulder pain, which has not been relieved by non-surgical interventions and has led to a recommendation for surgical treatment. The patient has mild COPD.  He is not in exacerbation.  He has reasonable exercise tolerance without any episodes of shortness of breath. Patient also likely has an element of obstructive sleep apnea.  I do not think it is severe enough to warrant testing prior to the OR.  However, he has a risk of losing his airway in the perioperative timeframe due to sedation and analgesia.  Anesthesia should monitor his airway closely till the patient is fully awake. The patient is in mild pulmonary risk for the planned surgical procedure.  The patient will follow-up with me once he has recovered from the shoulder surgery to conduct a complete set of breathing tests post-surgery and a home sleep test for sleep apnea.  The patient needs to discontinue smoking.  He will continue the Spiriva and as needed albuterol  - Referrals: Continue follow-up with cardiologist as necessary.  - Patient Education: Educated patient on the importance of additional inhaler (albuterol) for emergency situations and the potential for COPD given smoking history.  - Follow-Up: Follow-up appointment scheduled for 4 to 6 months post-surgery.

## 2024-09-17 NOTE — PROCEDURE
[FreeTextEntry1] : PA and LAT CXR Report  PA and LAT CXR was ordered to evaluate for causes of dyspnea.   The films demonstrates: The heart and mediastinal structures are normal There are no increased interstitial markings. There are no infiltrates present There are no nodules or  masses present    Impression: NAD

## 2024-09-17 NOTE — HISTORY OF PRESENT ILLNESS
[TextBox_4] : Subjective: - Summary : Patient presented for preoperative evaluation before a shoulder surgery. The patient has been recommended the surgery due to lingering pain that is not going away. Patient is a smoker with history of COPD. - History of Present Illness : The patient, who has a history of being a smoker with roughly 2 packs per day from the age of 16, has been recommended shoulder surgery due to longstanding pain. The patient has no notable limitations in daily activities. He has been seeing a cardiologist, Dr. Jessica, who has declared his heart health to be satisfactory. The pain is non-resolving and he has been told that it will not go away without surgery. He does not have frequent bronchitis or pneumonia.  He is not limited at all in his daily activity.  He is able to walk 1-2 flight of stairs with minimal exertion.  - Past Medical History : The patient has been diagnosed with COPD, for which he is on daily Spiriva. There is no other notable past medical condition. - Family History : - Mother had COPD, was a smoker  - Social History : - Smoker, roughly 2 packs per day from age 16
